# Patient Record
(demographics unavailable — no encounter records)

---

## 2025-03-27 NOTE — ASSESSMENT
[Levothyroxine] : The patient was instructed to take Levothyroxine on an empty stomach, separate from vitamins, and wait at least 30 minutes before eating [FreeTextEntry1] : Patient with recent diagnosis of papillary thyroid cancer.In reviewing her pathology report  there was a discrepancy in size saying that the tumor was 1.6 x  8 cm but  was 1.8 x 0.8 cm. This was corrected. On Synthroid 112. Last TSH was suppressed; repeated  today.      Had a Thyrogen treatment with 99.9 mCi FARRIS. I 131.Post therapy scan shows only uptake in neck.  She had some dryness in her mouth,but has resolved. 2021   Thyrogen stimulated WBIS  no uptake but TG 0.39 day 1 .46 day 5  Sonogram shows 3 mm nodule thyroid bed but when repeated last year in August with no longer seen. She did do a bone density which showed osteopenia vitamin D was low so we will start taking 2000 IUs of D3 daily.  Should stay well-hydrated since she does have a history of a kidney stone.  Repeat bone density 2 years.

## 2025-03-27 NOTE — HISTORY OF PRESENT ILLNESS
[FARRIS] : patient was treated with radioactive iodine [Thyrogen-stimulated] : thyrogen-stimulated [FreeTextEntry1] : 47 [FreeTextEntry2] : 0.2 [de-identified] : 2/2020 [de-identified] : completion thyroidectomy  [de-identified] : pT1b  pNX pMX  1.6 cm Tall cell variant papillary  [de-identified] : 7/30/20 [de-identified] : 99.9 [de-identified] : 9/22/2021  [de-identified] : 3 mm nodule left thyroid bed  [de-identified] : 7/30/2020 [de-identified] : iodine avid uptake only  in neck  [de-identified] : 11/1/2021 [de-identified] : no uptake  [de-identified] : 11/2001 [de-identified] : TG day 1 0.39, day 4 0.46 day 5 0.5

## 2025-03-27 NOTE — PHYSICAL EXAM
[Alert] : alert [Well Nourished] : well nourished [No Acute Distress] : no acute distress [Well Developed] : well developed [Normal Sclera/Conjunctiva] : normal sclera/conjunctiva [EOMI] : extra ocular movement intact [No Proptosis] : no proptosis [Normal Oropharynx] : the oropharynx was normal [Well Healed Scar] : well healed scar [No Respiratory Distress] : no respiratory distress [Clear to Auscultation] : lungs were clear to auscultation bilaterally [Normal PMI] : the apical impulse was normal [Normal Rate] : heart rate was normal [No Edema] : no peripheral edema [Normal Bowel Sounds] : normal bowel sounds [Not Distended] : not distended [Soft] : abdomen soft [Normal Anterior Cervical Nodes] : no anterior cervical lymphadenopathy [No CVA Tenderness] : no ~M costovertebral angle tenderness [No Stigmata of Cushings Syndrome] : no stigmata of Cushings Syndrome [Normal Gait] : normal gait [Normal Strength/Tone] : muscle strength and tone were normal [No Rash] : no rash [Normal Reflexes] : deep tendon reflexes were 2+ and symmetric [No Tremors] : no tremors [Oriented x3] : oriented to person, place, and time [Acanthosis Nigricans] : no acanthosis nigricans